# Patient Record
Sex: MALE | Race: BLACK OR AFRICAN AMERICAN
[De-identification: names, ages, dates, MRNs, and addresses within clinical notes are randomized per-mention and may not be internally consistent; named-entity substitution may affect disease eponyms.]

---

## 2019-04-06 ENCOUNTER — HOSPITAL ENCOUNTER (INPATIENT)
Dept: HOSPITAL 80 - FED | Age: 54
LOS: 4 days | Discharge: HOME | DRG: 720 | End: 2019-04-10
Attending: FAMILY MEDICINE | Admitting: FAMILY MEDICINE
Payer: MEDICAID

## 2019-04-06 DIAGNOSIS — A41.9: Primary | ICD-10-CM

## 2019-04-06 DIAGNOSIS — J18.9: ICD-10-CM

## 2019-04-06 DIAGNOSIS — R73.9: ICD-10-CM

## 2019-04-06 DIAGNOSIS — F41.9: ICD-10-CM

## 2019-04-06 DIAGNOSIS — J96.00: ICD-10-CM

## 2019-04-06 DIAGNOSIS — R65.20: ICD-10-CM

## 2019-04-06 DIAGNOSIS — F17.200: ICD-10-CM

## 2019-04-06 DIAGNOSIS — F32.9: ICD-10-CM

## 2019-04-06 LAB — PLATELET # BLD: 195 10^3/UL (ref 150–400)

## 2019-04-06 RX ADMIN — SODIUM CHLORIDE SCH MLS: 900 INJECTION, SOLUTION INTRAVENOUS at 18:45

## 2019-04-06 NOTE — PDGENHP
History and Physical





- Chief Complaint


Fever, nausea vomiting, cough





- History of Present Illness








Source-patient provides history appears reliable.  EMR was reviewed and case 

discussed with ED provider.





HPI - this is a pleasant 53-year-old gentleman with past medical history 

significant for anxiety, depression, HTN listed as but reports not on meds 

presents emergency department today with complaints of several days of fevers, 

nausea/vomiting, abdominal pain.  Patient also reports that for the last 

several days he has been experiencing a nonproductive cough.  He works in 

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 

allergy type symptoms with rhinorrhea and cough.  He has been taking Flonase to 

try to help alleviate the symptoms without significant relief.  Today he felt 

like he was getting water on his lungs.  Patient was having fevers and chills 

at home as well as sweats.  He feels quite unwell.  He has had no urge to 

smoke.  He denies any shortness of breath or chest pain.  He has not had any 

diarrhea no known sick contacts.  He denies any hematemesis, melena or 

hematochezia.  He describes some left upper quadrant abdominal cramping and 

discomfort to the ED provider.  CT abdomen pelvis was unremarkable but 

incidentally noted multifocal pneumonia.





History Information





- Allergies/Home Medication List


Allergies/Adverse Reactions: 








No Known Allergies Allergy (Unverified 04/06/19 12:01)


 





Home Medications: 








Gabapentin  04/06/19 [Last Taken Unknown]


Prozac 20 MG (*)  04/06/19 [Last Taken Unknown]


traZODone [traZODONE 50MG (*)] 50 mg PO HS 04/06/19 [Last Taken 04/05/19]





I have personally reviewed and updated: family history, medical history, social 

history, surgical history





- Past Medical History


Additional medical history: Anxiety and depression.  Listed in EMR is HTN 

however patient denies diagnosis.  He reports occasional episodes of 

hypoglycemia.  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  Left ulnar fracture





- Surgical History


Additional surgical history: Denies





- Family History


Additional family history: Patient does not know family history.





- Social History


Smoking Status: Heavy smoker


Tobacco Use: Cigarettes (One pack per day)


Alcohol Use: Occasionally (Patient reports 1-2 beers most days.)


Drug Use: Marijuana (Occasional)


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  Cor status-full





Review of Systems


Review of Systems: 





ROS: 10pt was reviewed & negative except for what was stated in HPI & below


Constitutional: Reports: chills, fever, malaise, recent illness


EENMT: Reports: nose congestion, sore throat


Cardiac: Reports: no symptoms


Respiratory: Reports: cough.  Denies: shortness of breath


Gastrointestinal: Reports: vomitting, abdominal pain (LUQ), nausea.  Denies: 

diarrhea


Genitourinary: Reports: no symptoms


Muscolosketal: Reports: joint pain (hand/wrist)


Skin: Reports: no symptoms


Neurological: Reports: anxiety, depressed (no SI/HI.)


Hematologic/Lymphatic: Reports: no symptoms





Physical Exam


Physical Exam: 








 Selected Entries











  04/06/19





  11:58


 


Blood Pressure Automatic





Method 


 


Heart Rate 131 H


 


Respiratory 18





Rate 


 


O2 Sat (%) 88 L


 


Temperature (C) 38.1 C


 


Blood Pressure 150/90 H


 


Mean Arterial 110 H





Pressure (MAP) 


 


O2 Delivery Room Air





Mode 


 


Temperature Oral





Source 

















Temp Pulse Resp BP Pulse Ox


 


 37.4 C   114 H  16   168/79 H  93 


 


 04/06/19 14:27  04/06/19 14:27  04/06/19 14:27  04/06/19 14:27  04/06/19 14:29











Constitutional: other (Patient lays quietly in bed.  Appears acutely ill and 

diaphoretic but nontoxic.)


Eyes: PERRL (Slightly decreased reactivity light bilaterally but symmetric.), 

anicteric sclera, EOMI, No scleral injection


Ears, Nose, Mouth, Throat: poor dentition (Dentition in fair condition.), dry 

mucous membranes, other (No nasal discharge.)


Cardiovascular: regular rate and rhythym, no murmur, rub, or gallop, pulses 

symmetric bilaterally, tachycardia, No edema


Peripheral Pulses: 2+: dorsalis-pedis (R), dorsalis-pedis (L)


Respiratory: no respiratory distress, inspiratory crackles, other (Patient with 

some increased work of breathing/tachypnea but no accessory muscle use.), No 

expiratory wheeze


Gastrointestinal: normoactive bowel sounds, soft, non-tender abdomen, 

distension (Is mild distention but soft.), No guarding, No rebound


Genitourinary: no bladder tenderness, No cunha in urethra


Skin: warm, other (Patient is diaphoretic)


Musculoskeletal: full muscle strength (Patient is able to sit up independently.)

, joint tenderness (Ulnar aspect of left wrist with a old well-healed healed 

deformity.)


Neurologic: AAOx3, sensation intact bilaterally, other (Grossly nonfocal), No 

facial droop


Psychiatric: interacting appropriately, anxious, flat affect, other (Patient 

appears acutely ill but he is pleasant and cooperative.)





Lab Data & Imaging Review





 04/06/19 13:16





 04/06/19 13:16














WBC  12.13 10^3/uL (3.80-9.50)  H  04/06/19  13:16    


 


RBC  4.49 10^6/uL (4.40-6.38)   04/06/19  13:16    


 


Hgb  14.6 g/dL (13.7-17.5)   04/06/19  13:16    


 


Hct  40.5 % (40.0-51.0)   04/06/19  13:16    


 


MCV  90.2 fL (81.5-99.8)   04/06/19  13:16    


 


MCH  32.5 pg (27.9-34.1)   04/06/19  13:16    


 


MCHC  36.0 g/dL (32.4-36.7)   04/06/19  13:16    


 


RDW  13.6 % (11.5-15.2)   04/06/19  13:16    


 


Plt Count  195 10^3/uL (150-400)   04/06/19  13:16    


 


MPV  9.7 fL (8.7-11.7)   04/06/19  13:16    


 


Neut % (Auto)  90.0 % (39.3-74.2)  H  04/06/19  13:16    


 


Lymph % (Auto)  5.5 % (15.0-45.0)  L  04/06/19  13:16    


 


Mono % (Auto)  3.9 % (4.5-13.0)  L  04/06/19  13:16    


 


Eos % (Auto)  0.1 % (0.6-7.6)  L  04/06/19  13:16    


 


Baso % (Auto)  0.2 % (0.3-1.7)  L  04/06/19  13:16    


 


Nucleat RBC Rel Count  0.0 % (0.0-0.2)   04/06/19  13:16    


 


Absolute Neuts (auto)  10.92 10^3/uL (1.70-6.50)  H  04/06/19  13:16    


 


Absolute Lymphs (auto)  0.67 10^3/uL (1.00-3.00)  L  04/06/19  13:16    


 


Absolute Monos (auto)  0.47 10^3/uL (0.30-0.80)   04/06/19  13:16    


 


Absolute Eos (auto)  0.01 10^3/uL (0.03-0.40)  L  04/06/19  13:16    


 


Absolute Basos (auto)  0.02 10^3/uL (0.02-0.10)   04/06/19  13:16    


 


Absolute Nucleated RBC  0.00 10^3/uL (0-0.01)   04/06/19  13:16    


 


Immature Gran %  0.3 % (0.0-1.1)   04/06/19  13:16    


 


Immature Gran #  0.04 10^3/uL (0.00-0.10)   04/06/19  13:16    


 


VBG Lactic Acid  1.1 mmol/L (0.7-2.1)   04/06/19  14:35    


 


Sodium  134 mEq/L (135-145)  L  04/06/19  13:16    


 


Potassium  3.6 mEq/L (3.5-5.2)   04/06/19  13:16    


 


Chloride  98 mEq/L ()   04/06/19  13:16    


 


Carbon Dioxide  20 mEq/l (22-31)  L  04/06/19  13:16    


 


Anion Gap  16 mEq/L (6-14)  H  04/06/19  13:16    


 


BUN  18 mg/dL (7-23)   04/06/19  13:16    


 


Creatinine  0.9 mg/dL (0.7-1.3)   04/06/19  13:16    


 


Estimated GFR  > 60   04/06/19  13:16    


 


Glucose  170 mg/dL ()  H  04/06/19  13:16    


 


Calcium  9.9 mg/dL (8.5-10.4)   04/06/19  13:16    


 


Total Bilirubin  1.2 mg/dL (0.1-1.4)   04/06/19  13:16    


 


Conjugated Bilirubin  0.4 mg/dL (0.0-0.5)   04/06/19  13:16    


 


Unconjugated Bilirubin  0.8 mg/dL (0.0-1.1)   04/06/19  13:16    


 


AST  119 IU/L (17-59)  H  04/06/19  13:16    


 


ALT  59 IU/L (21-72)   04/06/19  13:16    


 


Alkaline Phosphatase  89 IU/L ()   04/06/19  13:16    


 


Total Protein  7.7 g/dL (6.3-8.2)   04/06/19  13:16    


 


Albumin  4.5 g/dL (3.5-5.0)   04/06/19  13:16    


 


Lipase  112 IU/L ()   04/06/19  13:16    








Imaging Review: 





CT Scan of the Abdomen and Pelvis (With Contrast) 


 


 Clinical Indications:  Abd Pain 


 


 Technique:    90 mL of Isovue 300 were given intravenously by machine power 

injection. 


Multidetector helical CT imaging was performed from the diaphragm to the 

symphysis pubis. Dose 


reduction techniques were utilized. 


 


 Findings: Airspace consolidation is present in the lower lungs bilaterally 

suggestive of 


multilobar pneumonia. 


 


 Liver and spleen enhance normally. Pancreas and kidneys enhance symmetrically. 

The adrenal glands 


are normal in appearance. Visualized bowel loops are nonspecific in features 

with mild distention 


of the cecum. No peritoneal free fluid. 


 


 CT Pelvis: No masses or free fluid. 


 


 On sagittal reconstructions, there is evidence of bilateral spondylolysis and 

grade 1 


spondylolisthesis at L4-L5, with marked intervertebral disk height loss. 


 


 


Impression: 


1. Airspace consolidation in the lower lungs bilaterally suggestive of 

multilobar pneumonia. 


2. Nonspecific bowel gas pattern without features of obstruction. 


3. Bilateral spondylolysis and grade 1 spondylolisthesis with degenerative disk 

disease at L4-L5. 


 


 


Results called to Dr. Vishal García at 2:25 PM. 


 


 


               Dictated By: Gamaliel Garza MD 


Visualized and Interpreted Chest x-ray results: Yes


Visualized and Interpreted imaging results: Yes





Assessment & Plan


Assessment: 








53-year-old gentleman with a history of anxiety and depression who presents 

emergency department with complaints of 1 week of cough and several days of 

fever, nausea vomiting.





#Multifocal Pneumonia (Acute) - seen on CT abdomen pelvis as patient complained 

of some left upper quadrant discomfort.  Chest x-ray is ordered and pending.  

Patient has been started on Rocephin and azithromycin.  Will plan to continue 

for community acquired coverage. incentive spirometry. 





#Severe sepsis (Acute) - patient qualifies with fever, tachypnea, leukocytosis 

and elevated lactate greater than 4 patient does not clinically appear to be in 

septic shock however.  Additionally patient's repeat lactate after appropriate 

IV fluid bolus is normalized to 1.1.  Blood cultures x2 are pending.  Patient 

antibiotics and therapy as noted above.  Monitor patient's vital signs closely.





#Hypoxia - secondary to pneumonia.  Supplemental oxygen to maintain O2 sats 

greater than 90. Incentive spirometer as noted above.  





# hyperglycemia - patient reports normally has episodes of hypoglycemia.  He 

denies any history of diabetes.  This is nonfasting lab will plan to check in 

the morning could be stress response.





#anxiety/depression - continue Prozac and gabapentin.





#tobacco dependence - patient encouraged to quit altogether.  Nicotine patch 

ordered





#hx of daily etoh - patient reports drinking 1 or 2 beers on most days of the 

week.  No history of withdrawals.  Will monitor closely.





FEN - continuous IV fluids after bolus as patient does appear to be Dehydrated.

  Electrolytes will be monitored replaced if needed.  At this time are 

adequate.  Diet as tolerated.


PPX-SCDs.  Lovenox as anticipate patient will be here for several days.


Cor status-full.


Disposition-patient admitted inpatient status on [facility] floor for close 

monitoring in setting of Nuria with severe sepsis.

## 2019-04-06 NOTE — EDPHY
H & P


Stated Complaint: abd pain/N/V


Time Seen by Provider: 04/06/19 12:54


HPI/ROS: 





CHIEF COMPLAINT:  Nausea vomiting, fever





HISTORY OF PRESENT ILLNESS:  The patient is a 53-year-old man who comes to the 

emergency department complaining of 2 days of nausea and vomiting as well as a 

fever.  He denies chest pain or shortness of breath.  No upper respiratory 

symptoms.  No urinary symptoms.  He has not had diarrhea.  He has not had any 

sick contacts that he knows.  He has never had symptoms like this before.  He 

denies any significant abdominal pain but does have cramping in his left upper 

quadrant.  No back pain or flank pain.


Severity:  Moderate


Modifying factors:  No improvement with TheraFlu





REVIEW OF SYSTEMS:


Constitutional:  denies: chills, fever, recent illness, recent injury


EENTM: denies: blurred vision, double vision, nose congestion


Respiratory: denies: cough, shortness of breath


Cardiac: denies: chest pain, irregular heart rate, lightheadedness, palpitations


Gastrointestinal/Abdominal:  See HPI


Genitourinary: denies: dysuria, frequency, hematuria, pain


Musculoskeletal: denies: joint pain, muscle pain


Skin: denies: lesions, rash, jaundice, bruising


Neurological: denies: headache, numbness, paresthesia, tingling, dizziness, 

weakness


Hematologic/Lymphatic: denies: blood clots, easy bleeding, easy bruising


Immunologic/allergic: denies: HIV/AIDS, transplant


 10 systems reviewed and negative except as noted





EXAM:


GENERAL:  Moderate distress


HEAD:  Atraumatic, normocephalic.


EYES:  Pupils equal round and reactive to light, extraocular movements intact, 

sclera anicteric, conjunctiva are normal.


ENT:  TMs normal, nares patent, oropharynx clear without exudates.  Dry mucous 

membranes.


NECK:  Normal range of motion, supple without lymphadenopathy or JVD.


LUNGS:  Breath sounds clear to auscultation bilaterally and equal.  No wheezes 

rales or rhonchi.


HEART:  Tachycardic, Regular rhythm without murmurs, rubs or gallops.


ABDOMEN:  Soft, nontender, normoactive bowel sounds.  No guarding, no rebound.  

No masses appreciated. 


BACK:  No CVA tenderness, no spinal tenderness, step-offs or deformities


EXTREMITIES:  Normal range of motion, no pitting or edema.  No clubbing or 

cyanosis.


NEUROLOGICAL:  Cranial nerves II through XII grossly intact.  Normal speech, 

normal gait.  5/5 strength, normal movement in all extremities, normal sensation

, normal reflexes


PSYCH:  Normal mood, normal affect.


SKIN:  Warm, dry, normal turgor, no visible rashes or lesions.








Source: Patient


Exam Limitations: No limitations





- Personal History


Current Tetanus/Diphtheria Vaccine: No





- Medical/Surgical History


Hx Asthma: No


Hx Chronic Respiratory Disease: Yes


Hx Diabetes: No


Hx Cardiac Disease: Yes


Hx Renal Disease: No


Hx Cirrhosis: No


Hx Alcoholism: No


Other PMH: HTN





- Family History


Significant Family History: No pertinent family hx





- Social History


Smoking Status: Heavy smoker


Alcohol Use: None


Constitutional: 


 Initial Vital Signs











Temperature (C)  38.1 C   04/06/19 11:58


 


Heart Rate  131 H  04/06/19 11:58


 


Respiratory Rate  18   04/06/19 11:58


 


Blood Pressure  150/90 H  04/06/19 11:58


 


O2 Sat (%)  88 L  04/06/19 11:58








 











O2 Delivery Mode               Room Air


 


O2 (L/minute)                  2














Allergies/Adverse Reactions: 


 





No Known Allergies Allergy (Unverified 04/06/19 12:01)


 








Home Medications: 














 Medication  Instructions  Recorded


 


FLUoxetine [Prozac 20 MG (*)] 20 mg PO DAILY 04/06/19


 


Gabapentin [Neurontin 300 MG (*)] 300 mg PO BID 04/06/19


 


traZODone [traZODONE 50MG (*)] 50 mg PO HS 04/06/19














Medical Decision Making





- Diagnostics


Imaging Results: 


 Imaging Impressions





Abdomen CT  04/06/19 12:58


Impression:  


1. Airspace consolidation in the lower lungs bilaterally suggestive of 

multilobar pneumonia.


2. Nonspecific bowel gas pattern without features of obstruction.


3. Bilateral spondylolysis and grade 1 spondylolisthesis with degenerative disk 

disease at L4-L5.


 


 


Results called to Dr. Vishal García at 2:25 PM.


 








Chest X-Ray  04/06/19 14:24


Impression: Bilateral lower lobe pneumonia.











Imaging: Discussed imaging studies w/ On call Radiologist


ED Course/Re-evaluation: 





Patient qualifies as severe sepsis.  He is stable vital signs.  He has received 

his fluid bolus.  He has received antibiotics and lactate is being repeated.  I 

will admit for bilateral pneumonia.  He is slightly hypoxic on room air at 88%.


Differential Diagnosis: 





Partial list of the Differential diagnosis considered include but were not 

limited to;  pneumonia, gastroenteritis and although unlikely based on the 

history and physical exam, I also considered perforation, ischemia, volvulus, 

acute coronary disease.  


Critical Care Time: 





Critical care time spent by me, Dr. García exclusive with this patient was 45 

minutes, exclusive of the PA time exclusive of procedures.  The organ system 

that was at risk was pulmonary, cardiovascular and I gave IV fluids, antibiotics

, consultation and admission to prevent worsening of the patient's condition





- Data Points


Laboratory Results: 


 Laboratory Results





 04/06/19 13:16 





 04/06/19 13:16 





 











  04/06/19 04/06/19 04/06/19





  13:16 13:16 13:05


 


WBC    12.13 10^3/uL H 10^3/uL  





    (3.80-9.50)  


 


RBC    4.49 10^6/uL 10^6/uL  





    (4.40-6.38)  


 


Hgb    14.6 g/dL g/dL  





    (13.7-17.5)  


 


Hct    40.5 % %  





    (40.0-51.0)  


 


MCV    90.2 fL fL  





    (81.5-99.8)  


 


MCH    32.5 pg pg  





    (27.9-34.1)  


 


MCHC    36.0 g/dL g/dL  





    (32.4-36.7)  


 


RDW    13.6 % %  





    (11.5-15.2)  


 


Plt Count    195 10^3/uL 10^3/uL  





    (150-400)  


 


MPV    9.7 fL fL  





    (8.7-11.7)  


 


Neut % (Auto)    90.0 % H %  





    (39.3-74.2)  


 


Lymph % (Auto)    5.5 % L %  





    (15.0-45.0)  


 


Mono % (Auto)    3.9 % L %  





    (4.5-13.0)  


 


Eos % (Auto)    0.1 % L %  





    (0.6-7.6)  


 


Baso % (Auto)    0.2 % L %  





    (0.3-1.7)  


 


Nucleat RBC Rel Count    0.0 % %  





    (0.0-0.2)  


 


Absolute Neuts (auto)    10.92 10^3/uL H 10^3/uL  





    (1.70-6.50)  


 


Absolute Lymphs (auto)    0.67 10^3/uL L 10^3/uL  





    (1.00-3.00)  


 


Absolute Monos (auto)    0.47 10^3/uL 10^3/uL  





    (0.30-0.80)  


 


Absolute Eos (auto)    0.01 10^3/uL L 10^3/uL  





    (0.03-0.40)  


 


Absolute Basos (auto)    0.02 10^3/uL 10^3/uL  





    (0.02-0.10)  


 


Absolute Nucleated RBC    0.00 10^3/uL 10^3/uL  





    (0-0.01)  


 


Immature Gran %    0.3 % %  





    (0.0-1.1)  


 


Immature Gran #    0.04 10^3/uL 10^3/uL  





    (0.00-0.10)  


 


VBG Lactic Acid      4.9 mmol/L H mmol/L





     (0.7-2.1) 


 


Sodium  134 mEq/L L mEq/L    





   (135-145)   


 


Potassium  3.6 mEq/L mEq/L    





   (3.5-5.2)   


 


Chloride  98 mEq/L mEq/L    





   ()   


 


Carbon Dioxide  20 mEq/l L mEq/l    





   (22-31)   


 


Anion Gap  16 mEq/L H mEq/L    





   (6-14)   


 


BUN  18 mg/dL mg/dL    





   (7-23)   


 


Creatinine  0.9 mg/dL mg/dL    





   (0.7-1.3)   


 


Estimated GFR  > 60     





    


 


Glucose  170 mg/dL H mg/dL    





   ()   


 


Calcium  9.9 mg/dL mg/dL    





   (8.5-10.4)   


 


Total Bilirubin  1.2 mg/dL mg/dL    





   (0.1-1.4)   


 


Conjugated Bilirubin  0.4 mg/dL mg/dL    





   (0.0-0.5)   


 


Unconjugated Bilirubin  0.8 mg/dL mg/dL    





   (0.0-1.1)   


 


AST  119 IU/L H IU/L    





   (17-59)   


 


ALT  59 IU/L IU/L    





   (21-72)   


 


Alkaline Phosphatase  89 IU/L IU/L    





   ()   


 


Total Protein  7.7 g/dL g/dL    





   (6.3-8.2)   


 


Albumin  4.5 g/dL g/dL    





   (3.5-5.0)   


 


Lipase  112 IU/L IU/L    





   ()   











Medications Given: 


 








Discontinued Medications





Hydromorphone HCl (Dilaudid)  1 mg IVP EDNOW ONE


   Stop: 04/06/19 12:58


   Last Admin: 04/06/19 13:07 Dose:  1 mg


Sodium Chloride (Ns)  1,000 mls @ 0 mls/hr IV ONCE ONE


   PRN Reason: Wide Open


   Stop: 04/06/19 12:52


   Last Admin: 04/06/19 13:01 Dose:  1,000 mls


Sodium Chloride (Ns)  2,600 mls @ 5,200 mls/hr 30 ml/kg infuse over 30 min (

2600 ml) IV EDNOW ONE


   PRN Reason: Protocol


   Stop: 04/06/19 13:27


   Last Admin: 04/06/19 13:12 Dose:  2,600 mls


Ceftriaxone Sodium/Dextrose (Rocephin 1 Gm (Premix))  50 mls @ 100 mls/hr IV 

EDNOW ONE


   PRN Reason: Protocol


   Stop: 04/06/19 14:18


   Last Admin: 04/06/19 14:18 Dose:  50 mls


Azithromycin 500 mg/ Sodium (Chloride)  255 mls @ 255 mls/hr IV EDNOW ONE


   PRN Reason: Protocol


   Stop: 04/06/19 15:24


   Last Admin: 04/06/19 15:29 Dose:  255 mls


Metoclopramide HCl (Reglan Injection)  10 mg IVP EDNOW ONE


   Stop: 04/06/19 12:58


   Last Admin: 04/06/19 13:07 Dose:  10 mg


Ondansetron HCl (Zofran)  4 mg IVP EDNOW ONE


   Stop: 04/06/19 12:52


   Last Admin: 04/06/19 13:01 Dose:  4 mg








Departure





- Departure


Disposition: [facility] Inpatient Acute


Clinical Impression: 


 Severe sepsis





Pneumonia


Qualifiers:


 Pneumonia type: due to unspecified organism Laterality: bilateral Lung location

: lower lobe of lung Qualified Code(s): J18.1 - Lobar pneumonia, unspecified 

organism





Condition: Critical

## 2019-04-07 LAB — PLATELET # BLD: 127 10^3/UL (ref 150–400)

## 2019-04-07 RX ADMIN — NICOTINE SCH MG: 21 PATCH, EXTENDED RELEASE TOPICAL at 08:36

## 2019-04-07 RX ADMIN — ENOXAPARIN SODIUM SCH MG: 100 INJECTION SUBCUTANEOUS at 08:36

## 2019-04-07 RX ADMIN — NICOTINE SCH: 21 PATCH, EXTENDED RELEASE TOPICAL at 03:25

## 2019-04-07 RX ADMIN — SODIUM CHLORIDE SCH MLS: 900 INJECTION, SOLUTION INTRAVENOUS at 20:25

## 2019-04-07 RX ADMIN — ACETAMINOPHEN PRN MG: 325 TABLET ORAL at 22:41

## 2019-04-07 RX ADMIN — SODIUM CHLORIDE SCH MLS: 900 INJECTION, SOLUTION INTRAVENOUS at 11:13

## 2019-04-07 RX ADMIN — GABAPENTIN SCH MG: 300 CAPSULE ORAL at 08:36

## 2019-04-07 RX ADMIN — GABAPENTIN SCH MG: 300 CAPSULE ORAL at 00:29

## 2019-04-07 RX ADMIN — GABAPENTIN SCH MG: 300 CAPSULE ORAL at 22:38

## 2019-04-07 RX ADMIN — ACETAMINOPHEN PRN MG: 325 TABLET ORAL at 14:13

## 2019-04-07 NOTE — HOSPPROG
Hospitalist Progress Note


Assessment/Plan: 





*  Pneumonia


   -IV ceftriaxone + azithro


*  Severe Sepsis - lactate 4.9


   -improved with IVF resuscitation


*  Acute respiratory failure


   -patient still very dyspneic with ambulation, 83% Room air


   -couldn't get through a shower due respiratory distress


*  Hyperglycemia


   -check HgA1c


*  Etoh/tobacco - watch for withdrawal

















Subjective: Recurrent fever this afternoon.   83% Room air, had episode of 

shaking rigors while in shower this afternoon.


Objective: 


 Vital Signs











Temp Pulse Resp BP Pulse Ox


 


 37.6 C   98   20   110/63   96 


 


 04/07/19 15:25  04/07/19 15:25  04/07/19 15:25  04/07/19 15:25  04/07/19 15:25








 Microbiology











 04/06/19 14:30 Respiratory Panel (PCR) - Final





 Nasal, Sinus - Swab    No Organism Detected By Pcr








 Laboratory Results





 04/07/19 04:02 





 04/07/19 04:02 





 











 04/06/19 04/07/19 04/08/19





 05:59 05:59 05:59


 


Intake Total  4800 


 


Output Total  500 250


 


Balance  4300 -250








CXR viewed, my personal interpretation is - lower lobe infiltrate


ct abd -  negative except for PNA





- Physical Exam


Constitutional: no apparent distress, appears nourished, not in pain


Cardiovascular: regular rate and rhythym, no murmur, rub, or gallop


Respiratory: no respiratory distress, no rales or rhonchi, clear to auscultation


Gastrointestinal: normoactive bowel sounds, soft, non-tender abdomen, no 

palpable masses


Skin: no rashes or abrasions, no fluctuance, no induration


Neurologic: AAOx3, sensation intact bilaterally


Psychiatric: interacting appropriately, not anxious, not encephalopathic, 

thought process linear





ICD10 Worksheet


Patient Problems: 


 Problems











Problem Status Onset


 


Pneumonia Acute  


 


Severe sepsis Acute

## 2019-04-07 NOTE — PDMN
Medical Necessity


Medical necessity: [facility] [place] Sepsis, A-3 days: 52 yo w/ c/o cough, fever, n/v.  

Eval reveals acute multifocal pneumonia w/ severe sepsis.  Lactic acid >4, pt 

is tachy 110-120, hypoxemic sat 88%RA requiring O2.  IVF, IV antibx started.  

BC pending.  Meets MCG IP criteria for sepsis w/ hemodynamic instability, 

hypoxemia, parenteral antimicrobial tx needed on IP basis.  Anticipate>2Mn for 

management of sepsis.

## 2019-04-07 NOTE — ASMTCMCOM
CM Note

 

CM Note                       

Notes:

[id number] Case Management Note



Discussed with RN and reviewed chart.  Pt admitted for pneumonia and severe sepsis.  There are no 

therapy evals ordered today.  Pt is ambulating without difficulty.



Referral to [facility] for community supports at discharge.



Case Management d/c poc:  anticipating independent with follow up as directed.



Case Management available if needs change.

 

Date Signed:  04/07/2019 03:29 PM

Electronically Signed By:Nadia Martin RN

## 2019-04-08 LAB — PLATELET # BLD: 141 10^3/UL (ref 150–400)

## 2019-04-08 RX ADMIN — ENOXAPARIN SODIUM SCH MG: 100 INJECTION SUBCUTANEOUS at 08:42

## 2019-04-08 RX ADMIN — NICOTINE SCH MG: 21 PATCH, EXTENDED RELEASE TOPICAL at 08:42

## 2019-04-08 RX ADMIN — ACETAMINOPHEN PRN MG: 325 TABLET ORAL at 08:42

## 2019-04-08 RX ADMIN — GABAPENTIN SCH MG: 300 CAPSULE ORAL at 08:42

## 2019-04-08 RX ADMIN — DOCUSATE SODIUM AND SENNOSIDES SCH TAB: 50; 8.6 TABLET ORAL at 21:01

## 2019-04-08 RX ADMIN — ACETAMINOPHEN PRN MG: 325 TABLET ORAL at 16:51

## 2019-04-08 RX ADMIN — GABAPENTIN SCH MG: 300 CAPSULE ORAL at 21:00

## 2019-04-08 RX ADMIN — ACETAMINOPHEN PRN MG: 325 TABLET ORAL at 12:34

## 2019-04-08 NOTE — HOSPPROG
Hospitalist Progress Note


Assessment/Plan: 





*  Pneumonia - worsening


   -change antibiotic to Levaquin


*  Severe Sepsis - lactate 4.9


   -improved with IVF resuscitation


*  Acute respiratory failure


   -worsening oxygen requirement - 73% on 3L, now up to 5L


*  Hyperglycemia


   -HgA1c 5.2


*  Etoh/tobacco - watch for withdrawal


*  h/o distant IVDA


   -check HIV, reports to nursing he is Hep C +

















Subjective: Bad night, oxygen had to be turned up to 5L.   Still having 

intermitted rigors and shaking chills.


Objective: 


 Vital Signs











Temp Pulse Resp BP Pulse Ox


 


 37.3 C   95   18   129/78 H  97 


 


 04/08/19 15:39  04/08/19 15:39  04/08/19 15:39  04/08/19 15:39  04/08/19 15:39








 Laboratory Results





 04/08/19 03:49 





 04/08/19 03:49 





 











 04/07/19 04/08/19 04/09/19





 05:59 05:59 05:59


 


Intake Total 4800 2119 1549


 


Output Total 500 950 675


 


Balance 4300 1169 874








CXR viewed, my personal interpretation is - worsening infiltrates


tele reviewed - NSR





- Physical Exam


Constitutional: no apparent distress, appears nourished, not in pain


Cardiovascular: regular rate and rhythym, no murmur, rub, or gallop


Respiratory: no respiratory distress, no rales or rhonchi, clear to auscultation


Gastrointestinal: normoactive bowel sounds, soft, non-tender abdomen, no 

palpable masses


Skin: no rashes or abrasions, no fluctuance, no induration


Neurologic: AAOx3, sensation intact bilaterally


Psychiatric: interacting appropriately, not anxious, not encephalopathic, 

thought process linear





ICD10 Worksheet


Patient Problems: 


 Problems











Problem Status Onset


 


Pneumonia Acute  


 


Severe sepsis Acute

## 2019-04-08 NOTE — HOSPPROG
Hospitalist Progress Note


Assessment/Plan: 





Cross cover note: called by nursing as patient feeling his lungs feel worse 

since changing abx. Noted that this am cxr appeared significantly worse with 

bilateral diffuse infiltrates developing since first xray. Repeat CXR without 

change from this am despite patient feeling worse, o2 needs stable. Abx changed 

from ctx/azith to levofloxacin monotherapy per day team. Will give lasix x 1, 

ordered urinary legionella ag given diffuse infiltrates. Consider addition of 

broad spectrum abx if patient decompensates, discussed with Dr. Niño. 


Objective: 


 Vital Signs











Temp Pulse Resp BP Pulse Ox


 


 37.1 C   91   24 H  133/77 H  97 


 


 04/08/19 19:44  04/08/19 19:44  04/08/19 19:44  04/08/19 19:44  04/08/19 19:44








 Laboratory Results





 04/08/19 03:49 





 04/08/19 18:14 





 











 04/07/19 04/08/19 04/09/19





 05:59 05:59 05:59


 


Intake Total 4800 2119 1649


 


Output Total 500 950 675


 


Balance 4300 1169 974














ICD10 Worksheet


Patient Problems: 


 Problems











Problem Status Onset


 


Pneumonia Acute  


 


Severe sepsis Acute

## 2019-04-09 LAB — HIV TYPE 1 AND 2: NEGATIVE

## 2019-04-09 RX ADMIN — ACETAMINOPHEN PRN MG: 325 TABLET ORAL at 20:52

## 2019-04-09 RX ADMIN — ONDANSETRON PRN MG: 4 TABLET, ORALLY DISINTEGRATING ORAL at 17:38

## 2019-04-09 RX ADMIN — ONDANSETRON PRN MG: 4 TABLET, ORALLY DISINTEGRATING ORAL at 09:16

## 2019-04-09 RX ADMIN — DOCUSATE SODIUM AND SENNOSIDES SCH TAB: 50; 8.6 TABLET ORAL at 20:53

## 2019-04-09 RX ADMIN — GABAPENTIN SCH: 300 CAPSULE ORAL at 20:53

## 2019-04-09 RX ADMIN — ACETAMINOPHEN PRN MG: 325 TABLET ORAL at 08:47

## 2019-04-09 RX ADMIN — ENOXAPARIN SODIUM SCH MG: 100 INJECTION SUBCUTANEOUS at 08:47

## 2019-04-09 RX ADMIN — NICOTINE SCH MG: 21 PATCH, EXTENDED RELEASE TOPICAL at 08:48

## 2019-04-09 RX ADMIN — GABAPENTIN SCH MG: 300 CAPSULE ORAL at 08:48

## 2019-04-09 RX ADMIN — DOCUSATE SODIUM AND SENNOSIDES SCH TAB: 50; 8.6 TABLET ORAL at 08:47

## 2019-04-09 NOTE — HOSPPROG
Hospitalist Progress Note


Assessment/Plan: 





*  Pneumonia - no improvement on Ceftriaxone/azithro


   -doing better on Levaquin


*  Severe Sepsis - lactate 4.9


   -improved with IVF resuscitation


*  Acute respiratory failure


   -still very tenuous respiratory status - 84% on 6L with ambulation today


   -DC when respiratory status improved


*  Hyperglycemia


   -HgA1c 5.2


*  Etoh/tobacco - watch for withdrawal


*  h/o distant IVDA


   -HIV negative, reports to nursing he is Hep C +

















Subjective: Increased respiratory distress again last night, had repeat CXR, 

feeling a little better today


Objective: 


 Vital Signs











Temp Pulse Resp BP Pulse Ox


 


 37.1 C   88   20   135/79 H  97 


 


 04/09/19 15:42  04/09/19 15:42  04/09/19 15:42  04/09/19 15:42  04/09/19 15:42








 Laboratory Results





 04/08/19 03:49 





 04/09/19 04:31 





 











 04/08/19 04/09/19 04/10/19





 05:59 05:59 05:59


 


Intake Total 2116 2149 


 


Output Total 383 6813 


 


Balance 1169 -326 








tele - NSR


CXR viewed, my personal interpretation is - continued severe bilateral 

infiltrates








- Physical Exam


Constitutional: no apparent distress, appears nourished, not in pain


Cardiovascular: regular rate and rhythym, no murmur, rub, or gallop


Respiratory: no respiratory distress, inspiratory crackles, No expiratory wheeze

, No rhonchi


Gastrointestinal: normoactive bowel sounds, soft, non-tender abdomen, no 

palpable masses


Skin: no rashes or abrasions, no fluctuance, no induration


Neurologic: AAOx3, sensation intact bilaterally


Psychiatric: interacting appropriately, not anxious, not encephalopathic, 

thought process linear





ICD10 Worksheet


Patient Problems: 


 Problems











Problem Status Onset


 


Pneumonia Acute  


 


Severe sepsis Acute

## 2019-04-10 VITALS — SYSTOLIC BLOOD PRESSURE: 126 MMHG | DIASTOLIC BLOOD PRESSURE: 96 MMHG

## 2019-04-10 RX ADMIN — GABAPENTIN SCH: 300 CAPSULE ORAL at 09:24

## 2019-04-10 RX ADMIN — GABAPENTIN SCH MG: 300 CAPSULE ORAL at 09:19

## 2019-04-10 RX ADMIN — NICOTINE SCH MG: 21 PATCH, EXTENDED RELEASE TOPICAL at 09:17

## 2019-04-10 RX ADMIN — ENOXAPARIN SODIUM SCH MG: 100 INJECTION SUBCUTANEOUS at 09:17

## 2019-04-10 RX ADMIN — DOCUSATE SODIUM AND SENNOSIDES SCH TAB: 50; 8.6 TABLET ORAL at 09:18

## 2019-04-18 NOTE — PDDCSUM
Discharge Summary


Discharge Summary: 





54 yo male admitted with pneumonia. Now better. See below. He will be complete 

a course of Levaquin. Pred burst has also been added.





F/U: with PCP next week





DDX:





*  Pneumonia - no improvement on Ceftriaxone/azithro


   -doing better on Levaquin


*  Severe Sepsis - lactate 4.9


   -improved with IVF resuscitation


*  Acute respiratory failure, resolved


   -now on RA


*  Hyperglycemia


   -HgA1c 5.2


*  Etoh/tobacco - no WD


*  h/o distant IVDA


   -HIV negative, reports to nursing he is Hep C +





Exam:


NAD


AAOX3


RRR


DECREASED LUNG SOUNDS, NORMAL WORK OF BREATHING


S/NT/ND





MEDS: SEE MED REC





TOTAL TIME SPENT ON D/C IS 35 MINS
No

## 2019-04-20 ENCOUNTER — HOSPITAL ENCOUNTER (EMERGENCY)
Dept: HOSPITAL 80 - FED | Age: 54
LOS: 1 days | Discharge: HOME | End: 2019-04-21
Payer: MEDICAID

## 2019-04-20 DIAGNOSIS — F20.0: ICD-10-CM

## 2019-04-20 DIAGNOSIS — Z79.899: ICD-10-CM

## 2019-04-20 DIAGNOSIS — R45.851: Primary | ICD-10-CM

## 2019-04-20 DIAGNOSIS — F10.10: ICD-10-CM

## 2019-04-20 DIAGNOSIS — I10: ICD-10-CM

## 2019-04-20 LAB — PLATELET # BLD: 422 10^3/UL (ref 150–400)

## 2019-04-20 PROCEDURE — G0480 DRUG TEST DEF 1-7 CLASSES: HCPCS

## 2019-04-20 NOTE — EDPHY
H & P


Stated Complaint: SI


Source: Patient


Exam Limitations: No limitations





- Personal History


Current Tetanus Diphtheria and Acellular Pertussis (TDAP): Yes





- Medical/Surgical History


Hx Asthma: No


Hx Chronic Respiratory Disease: Yes


Hx Diabetes: No


Hx Cardiac Disease: Yes


Hx Renal Disease: No


Hx Cirrhosis: No


Hx Alcoholism: No


Other PMH: HTN





- Family History


Significant Family History: No pertinent family hx





- Social History


Smoking Status: Heavy smoker


Alcohol Use: Heavy


Drug Use: Other


Time Seen by Provider: 04/20/19 21:56


HPI/ROS: 





CHIEF COMPLAINT:  Hearing voices, suicidal





HISTORY OF PRESENT ILLNESS:  Patient is a 53-year-old man with history of 

schizophrenia who currently takes BuSpar and trazodone.  He also admits to 

daily alcohol abuse and methamphetamine use 3 days ago.  He states that his 

voices are worsening and he feels suicidal because he cannot make them stop.  

He also feels somewhat paranoid and thinks people are after him.  No fevers.  

No chest pain.  No head injury.  No headache.


Severity:  Moderate


Modifying factors:  None





REVIEW OF SYSTEMS:


Constitutional:  denies: chills, fever, recent illness, recent injury


EENTM: denies: blurred vision, double vision, nose congestion


Respiratory: denies: cough, shortness of breath


Cardiac: denies: chest pain, irregular heart rate, lightheadedness, palpitations


Gastrointestinal/Abdominal: denies: abdominal pain, diarrhea, nausea, vomiting, 

blood streaked stools


Genitourinary: denies: dysuria, frequency, hematuria, pain


Musculoskeletal: denies: joint pain, muscle pain


Skin: denies: lesions, rash, jaundice, bruising


Neurological:  See HPI denies: headache, numbness, paresthesia, tingling, 

dizziness, weakness


Hematologic/Lymphatic: denies: blood clots, easy bleeding, easy bruising


Immunologic/allergic: denies: HIV/AIDS, transplant


 10 systems reviewed and negative except as noted





EXAM:


GENERAL:  Well-appearing, well-nourished and in no acute distress.


HEAD:  Atraumatic, normocephalic.


EYES:  Pupils equal round and reactive to light, extraocular movements intact, 

sclera anicteric, conjunctiva are normal.


ENT:  TMs normal, nares patent, oropharynx clear without exudates.  Moist 

mucous membranes.


NECK:  Normal range of motion, supple without lymphadenopathy or JVD.


LUNGS:  Breath sounds clear to auscultation bilaterally and equal.  No wheezes 

rales or rhonchi.


HEART:  Regular rate and rhythm without murmurs, rubs or gallops.


ABDOMEN:  Soft, nontender, normoactive bowel sounds.  No guarding, no rebound.  

No masses appreciated. 


BACK:  No CVA tenderness, no spinal tenderness, step-offs or deformities


EXTREMITIES:  Normal range of motion, no pitting or edema.  No clubbing or 

cyanosis.


NEUROLOGICAL:  Cranial nerves II through XII grossly intact.  Normal speech, 

normal gait.  5/5 strength, normal movement in all extremities, normal sensation

, normal reflexes


PSYCH:  Somewhat distracted, slightly overactive physical activity, tense, 

logical conversation, normal rate,


SKIN:  Warm, dry, normal turgor, no visible rashes or lesions.





 (Vishal García)


Constitutional: 


 Initial Vital Signs











Temperature (C)  36.8 C   04/20/19 21:33


 


Heart Rate  108 H  04/20/19 21:33


 


Respiratory Rate  16   04/20/19 21:33


 


Blood Pressure  126/85 H  04/20/19 21:33


 


O2 Sat (%)  94   04/20/19 21:33








 











O2 Delivery Mode               Room Air














Allergies/Adverse Reactions: 


 





No Known Allergies Allergy (Unverified 04/06/19 12:01)


 








Home Medications: 














 Medication  Instructions  Recorded


 


traZODone [traZODONE 50MG (*)] 50 mg PO HS 04/06/19


 


Buspar (*)  04/20/19














Medical Decision Making


ED Course/Re-evaluation: 





I will place the patient on a hold and lab work is been ordered.





10:30 p.m. patient is pacing in the hallway.  Now agreeable to Zyprexa





11:00 p.m. Care transferred to Dr. Lg Garcia.  Patient is medically clear.





 (Vishal García)





0630AM: Sleeping


0700AM: Patient signed over to Dr. Gonzalez. Pending ALTAGRACIA singh.  (Lg Garcia)





7:00 a.m.-I assumed care of this patient at shift change.  History of 

schizophrenia and methamphetamine abuse.  On an M1 hold for worsening psychosis 

and suicidal ideation.  Mental health evaluation pending.


2pm: signed over to Dr. Whatley.  ALTAGRACIA singh in progress


 (Brenda Gonzalez)


Differential Diagnosis: 





Partial list of the Differential diagnosis considered include but were not 

limited to;  schizophrenia, polysubstance abuse, suicidal ideation, withdrawal 

and although unlikely based on the history and physical exam, I also considered 

head injury, infection.  (Vishal García)


Other Provider: 





Care assumed at 2:00 p.m. Plan for mental health evaluation which is in 

progress.  Disposition pending their recommendations.





1418: Per Dr. Valentine recommendation to vacate hold and discharge; no plan, no 

intent despite thoughts. Not felt by psychiatric evaluator to be acutely danger 

to himself at this time. (Rudi Whatley)





- Data Points


Laboratory Results: 


 Laboratory Results





 04/20/19 22:10 





 04/20/19 22:10 








Medications Given: 


 








Discontinued Medications





Lorazepam (Ativan)  2 mg PO ONCE ONE


   Stop: 04/20/19 23:31


   Last Admin: 04/20/19 23:35 Dose:  2 mg


Olanzapine (Zyprexa Zydis)  10 mg PO EDNOW ONE


   Stop: 04/20/19 22:26


   Last Admin: 04/20/19 22:31 Dose:  10 mg








Departure





- Departure


Disposition: Home, Routine, Self-Care


Clinical Impression: 


 Suicidal ideation, Polysubstance abuse





Schizophrenia


Qualifiers:


 Schizophrenia type: paranoid schizophrenia Qualified Code(s): F20.0 - Paranoid 

schizophrenia





Condition: Fair


Instructions:  Schizophrenia (ED), Suicide Prevention (ED)


Referrals: 


[facility],. [Clinic] - As per Instructions

## 2019-04-21 VITALS — SYSTOLIC BLOOD PRESSURE: 128 MMHG | DIASTOLIC BLOOD PRESSURE: 67 MMHG
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
## 2019-04-21 NOTE — ASMTTCLDSP
[facility] Discharge Disposition

 

Disposition:                  Answers:  Discharge                             

If                            Answers:  Yes                                   

DISCHARGED: Patient/family                                                    

 given suicide hotline                                                        

info & SAMHSA brochure?                                                       

Discharge                     

Concerns/Recommendations:     

Notes:

In consultation with [facility] ED physician, Rudi Whatley MD, and on-call psychiatrist, Deepti Valentine MD, both concurred that the patient does not appear to meet 27-65 criteria requiring 

psychiatric hospitalization as patient does not appear to be an imminent risk of harm to 

self/others/gravely disabled due to a mental illness condition. Dr. Valentine provided telephone order 

read back vacating M1 hold at 13:45 hrs.



The patient was given local hotline information and SAMSHA brochure after an attempt and encouraged 


to follow up with [facility].

Was patient given the         Answers:  Not applicable                        

Inpatient Behavioral                                                          

Health Prohibited                                                             

Belongings List while in                                                      

the ED?                                                                       

Psychiatrist vacating M1      Balwinder Valentine MD

Hold:                         

Date and time M1 hold         04/21/2019 01:45 PM

vacated (time format is       

hh:mm):                       

Type of Hold:                 Answers:  M1/72-hour Hold                       

Hold initiated by:            Answers:  ED Physician                          

 

Date Signed:  04/21/2019 02:50 PM

Electronically Signed By:Sachi Sosa